# Patient Record
Sex: FEMALE | Race: OTHER | NOT HISPANIC OR LATINO | Employment: PART TIME | URBAN - METROPOLITAN AREA
[De-identification: names, ages, dates, MRNs, and addresses within clinical notes are randomized per-mention and may not be internally consistent; named-entity substitution may affect disease eponyms.]

---

## 2023-07-14 ENCOUNTER — OFFICE VISIT (OUTPATIENT)
Dept: OBSTETRICS AND GYNECOLOGY | Facility: CLINIC | Age: 28
End: 2023-07-14
Payer: MEDICAID

## 2023-07-14 VITALS
BODY MASS INDEX: 32.65 KG/M2 | SYSTOLIC BLOOD PRESSURE: 132 MMHG | WEIGHT: 184.31 LBS | DIASTOLIC BLOOD PRESSURE: 80 MMHG

## 2023-07-14 DIAGNOSIS — Z01.419 ENCOUNTER FOR GYNECOLOGICAL EXAMINATION (GENERAL) (ROUTINE) WITHOUT ABNORMAL FINDINGS: Primary | ICD-10-CM

## 2023-07-14 DIAGNOSIS — Z12.4 CERVICAL CANCER SCREENING: ICD-10-CM

## 2023-07-14 PROCEDURE — 3008F BODY MASS INDEX DOCD: CPT | Mod: CPTII,,, | Performed by: OBSTETRICS & GYNECOLOGY

## 2023-07-14 PROCEDURE — 1159F MED LIST DOCD IN RCRD: CPT | Mod: CPTII,,, | Performed by: OBSTETRICS & GYNECOLOGY

## 2023-07-14 PROCEDURE — 3008F PR BODY MASS INDEX (BMI) DOCUMENTED: ICD-10-PCS | Mod: CPTII,,, | Performed by: OBSTETRICS & GYNECOLOGY

## 2023-07-14 PROCEDURE — 99999 PR PBB SHADOW E&M-EST. PATIENT-LVL II: CPT | Mod: PBBFAC,,, | Performed by: OBSTETRICS & GYNECOLOGY

## 2023-07-14 PROCEDURE — 3075F SYST BP GE 130 - 139MM HG: CPT | Mod: CPTII,,, | Performed by: OBSTETRICS & GYNECOLOGY

## 2023-07-14 PROCEDURE — 1159F PR MEDICATION LIST DOCUMENTED IN MEDICAL RECORD: ICD-10-PCS | Mod: CPTII,,, | Performed by: OBSTETRICS & GYNECOLOGY

## 2023-07-14 PROCEDURE — 99385 PREV VISIT NEW AGE 18-39: CPT | Mod: S$PBB,,, | Performed by: OBSTETRICS & GYNECOLOGY

## 2023-07-14 PROCEDURE — 4010F ACE/ARB THERAPY RXD/TAKEN: CPT | Mod: CPTII,,, | Performed by: OBSTETRICS & GYNECOLOGY

## 2023-07-14 PROCEDURE — 4010F PR ACE/ARB THEARPY RXD/TAKEN: ICD-10-PCS | Mod: CPTII,,, | Performed by: OBSTETRICS & GYNECOLOGY

## 2023-07-14 PROCEDURE — 3075F PR MOST RECENT SYSTOLIC BLOOD PRESS GE 130-139MM HG: ICD-10-PCS | Mod: CPTII,,, | Performed by: OBSTETRICS & GYNECOLOGY

## 2023-07-14 PROCEDURE — 3079F DIAST BP 80-89 MM HG: CPT | Mod: CPTII,,, | Performed by: OBSTETRICS & GYNECOLOGY

## 2023-07-14 PROCEDURE — 88175 CYTOPATH C/V AUTO FLUID REDO: CPT | Performed by: OBSTETRICS & GYNECOLOGY

## 2023-07-14 PROCEDURE — 3079F PR MOST RECENT DIASTOLIC BLOOD PRESSURE 80-89 MM HG: ICD-10-PCS | Mod: CPTII,,, | Performed by: OBSTETRICS & GYNECOLOGY

## 2023-07-14 PROCEDURE — 99212 OFFICE O/P EST SF 10 MIN: CPT | Mod: PBBFAC,PN | Performed by: OBSTETRICS & GYNECOLOGY

## 2023-07-14 PROCEDURE — 99385 PR PREVENTIVE VISIT,NEW,18-39: ICD-10-PCS | Mod: S$PBB,,, | Performed by: OBSTETRICS & GYNECOLOGY

## 2023-07-14 PROCEDURE — 99999 PR PBB SHADOW E&M-EST. PATIENT-LVL II: ICD-10-PCS | Mod: PBBFAC,,, | Performed by: OBSTETRICS & GYNECOLOGY

## 2023-07-14 RX ORDER — FERROUS SULFATE 325(65) MG
TABLET ORAL
COMMUNITY

## 2023-07-14 RX ORDER — METRONIDAZOLE 10 MG/G
GEL TOPICAL
COMMUNITY
Start: 2023-06-20

## 2023-07-14 RX ORDER — LISINOPRIL 5 MG/1
TABLET ORAL
COMMUNITY

## 2023-07-14 RX ORDER — CLOBETASOL PROPIONATE 0.46 MG/ML
SOLUTION TOPICAL 2 TIMES DAILY
COMMUNITY
Start: 2023-06-21

## 2023-07-14 NOTE — PROGRESS NOTES
Chief Complaint   Patient presents with    [facility]    Well Woman     New Patient. Patient here for well woman exam, currently on cycle, day 5. Last cycle was 4-5 years ago       History and Physical:  Kiersten Sheehan is a 28 y.o. F who presents today for her routine annual GYN exam. The patient has no Gynecology complaints.    Annual:  Patient's last menstrual period was 2023 (exact date). 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 Didn't get HPV vaccination    Allergies: Review of patient's allergies indicates:  No Known Allergies  Past Medical History:   Diagnosis Date    Anemia      History reviewed. No pertinent surgical history.  MEDS:   Current Outpatient Medications on File Prior to Visit   Medication Sig Dispense Refill    clobetasoL (TEMOVATE) 0.05 % external solution Apply topically 2 (two) times daily.      dicyclomine (BENTYL) 20 mg tablet Take 1 tablet (20 mg total) by mouth 2 (two) times daily. 20 tablet 0    famotidine (PEPCID) 20 MG tablet Take 1 tablet (20 mg total) by mouth 2 (two) times daily. (Patient not taking: Reported on 2023) 20 tablet 0    ferrous sulfate (FEOSOL) 325 mg (65 mg iron) Tab tablet ferrous sulfate 325 mg (65 mg iron) tablet   TAKE 1 TABLET(S) EVERY DAY BY ORAL ROUTE FOR 90 DAYS.      lisinopriL (PRINIVIL,ZESTRIL) 5 MG tablet TAKE 1 TABLET BY MOUTH EVERY DAY for 30      metronidazole 1% (METROGEL) 1 % Gel SMARTSIG:sparingly Topical Daily       No current facility-administered medications on file prior to visit.     OB History          3    Para   3    Term   3            AB        Living   3         SAB        IAB        Ectopic        Multiple        Live Births                   Social History     Socioeconomic History     Marital status:    Tobacco Use    Smoking status: Never    Smokeless tobacco: Never   Substance and Sexual Activity    Alcohol use: Not Currently    Drug use: Never    Sexual activity: Yes     Family History   Problem Relation Age of Onset    Breast cancer Other        Past medical and surgical history reviewed.   I have reviewed the patient's medical history in detail and updated the computerized patient record.    Review of System:   General: no chills, fever, night sweats, weight gain or weight loss  Breasts: no new or changing breast lumps, nipple discharge or masses.  Gastrointestinal: no abdominal pain, change in bowel habits, or black or bloody stools  Genito-Urinary: no incontinence, urinary frequency/urgency or vulvar/vaginal symptoms, pelvic pain or abnormal vaginal bleeding.    Physical Exam:   /80   Wt 83.6 kg (184 lb 4.9 oz)   LMP 07/11/2023 (Exact Date)   BMI 32.65 kg/m²   Body mass index is 32.65 kg/m².  Constitutional: She appears alert and responsive. She appears well-developed, well-groomed, and well-nourished. No distress.  Breasts: are symmetrical.  Right breast exhibits no inverted nipple, no mass, no nipple discharge, no skin change and no tenderness.  Left breast exhibits no inverted nipple, no mass, no nipple discharge, no skin change and no tenderness.  Abdominal: Soft. She exhibits no distension, hernias or masses. There is no tenderness. No enlargement of liver edge or spleen.  There is no rebound and no guarding.   Genitourinary:    External rectal exam shows no thrombosed external hemorrhoids, no lesions.     Pelvic exam was performed with patient supine.   No labial fusion, and symmetrical.    There is no rash, lesion or injury on the right labia.   There is no rash, lesion or injury on the left labia.   No bleeding and no signs of injury around the vaginal introitus, urethral meatus is normal size and without prolapse or lesions, urethra well supported. The cervix is  visualized with no discharge, lesions or friability.   No vaginal discharge found.    No significant Cystocele, Enterocele or rectocele, and cervix and uterus well supported.   Bimanual exam:   The urethra is normal to palpation and there are no palpable vaginal wall masses.   Uterus is not deviated, not enlarged, not fixed, normal shape and not tender.   Cervix exhibits no motion tenderness.    Right adnexum displays no mass or nodularity and no tenderness.   Left adnexum displays no mass or nodularity and no tenderness.    Assessment/Plan:   Encounter for gynecological examination (general) (routine) without abnormal findings    Cervical cancer screening    Follow up in 1 year.

## 2023-07-21 LAB
FINAL PATHOLOGIC DIAGNOSIS: NORMAL
Lab: NORMAL

## 2024-09-17 ENCOUNTER — LAB VISIT (OUTPATIENT)
Dept: LAB | Facility: HOSPITAL | Age: 29
End: 2024-09-17
Payer: MEDICAID

## 2024-09-17 ENCOUNTER — OFFICE VISIT (OUTPATIENT)
Dept: OBSTETRICS AND GYNECOLOGY | Facility: CLINIC | Age: 29
End: 2024-09-17
Payer: MEDICAID

## 2024-09-17 VITALS
WEIGHT: 189.81 LBS | HEIGHT: 63 IN | BODY MASS INDEX: 33.63 KG/M2 | DIASTOLIC BLOOD PRESSURE: 90 MMHG | SYSTOLIC BLOOD PRESSURE: 150 MMHG

## 2024-09-17 DIAGNOSIS — N92.6 MISSED MENSES: Primary | ICD-10-CM

## 2024-09-17 DIAGNOSIS — N92.6 MISSED MENSES: ICD-10-CM

## 2024-09-17 LAB
B-HCG UR QL: NEGATIVE
CTP QC/QA: YES
DHEA-S SERPL-MCNC: 149.8 UG/DL (ref 95.8–511.7)
ESTIMATED AVG GLUCOSE: 97 MG/DL (ref 68–131)
ESTRADIOL SERPL-MCNC: 129 PG/ML
FSH SERPL-ACNC: 5.61 MIU/ML
HBA1C MFR BLD: 5 % (ref 4–5.6)
HCG INTACT+B SERPL-ACNC: <2.4 MIU/ML
LH SERPL-ACNC: 59.3 MIU/ML
PROLACTIN SERPL IA-MCNC: 9.4 NG/ML (ref 5.2–26.5)
TSH SERPL DL<=0.005 MIU/L-ACNC: 1.63 UIU/ML (ref 0.4–4)

## 2024-09-17 PROCEDURE — 36415 COLL VENOUS BLD VENIPUNCTURE: CPT | Mod: PN

## 2024-09-17 PROCEDURE — 99999PBSHW POCT URINE PREGNANCY: Mod: PBBFAC,,,

## 2024-09-17 PROCEDURE — 99213 OFFICE O/P EST LOW 20 MIN: CPT | Mod: S$PBB,,,

## 2024-09-17 PROCEDURE — 84402 ASSAY OF FREE TESTOSTERONE: CPT

## 2024-09-17 PROCEDURE — 84146 ASSAY OF PROLACTIN: CPT

## 2024-09-17 PROCEDURE — 82627 DEHYDROEPIANDROSTERONE: CPT

## 2024-09-17 PROCEDURE — 99213 OFFICE O/P EST LOW 20 MIN: CPT | Mod: PBBFAC,PN

## 2024-09-17 PROCEDURE — 3080F DIAST BP >= 90 MM HG: CPT | Mod: CPTII,,,

## 2024-09-17 PROCEDURE — 84443 ASSAY THYROID STIM HORMONE: CPT

## 2024-09-17 PROCEDURE — 83498 ASY HYDROXYPROGESTERONE 17-D: CPT

## 2024-09-17 PROCEDURE — 1159F MED LIST DOCD IN RCRD: CPT | Mod: CPTII,,,

## 2024-09-17 PROCEDURE — 3008F BODY MASS INDEX DOCD: CPT | Mod: CPTII,,,

## 2024-09-17 PROCEDURE — 81025 URINE PREGNANCY TEST: CPT | Mod: PBBFAC,PN

## 2024-09-17 PROCEDURE — 82670 ASSAY OF TOTAL ESTRADIOL: CPT

## 2024-09-17 PROCEDURE — 99999 PR PBB SHADOW E&M-EST. PATIENT-LVL III: CPT | Mod: PBBFAC,,,

## 2024-09-17 PROCEDURE — 84702 CHORIONIC GONADOTROPIN TEST: CPT

## 2024-09-17 PROCEDURE — 83002 ASSAY OF GONADOTROPIN (LH): CPT

## 2024-09-17 PROCEDURE — 1160F RVW MEDS BY RX/DR IN RCRD: CPT | Mod: CPTII,,,

## 2024-09-17 PROCEDURE — 83036 HEMOGLOBIN GLYCOSYLATED A1C: CPT

## 2024-09-17 PROCEDURE — 83001 ASSAY OF GONADOTROPIN (FSH): CPT

## 2024-09-17 PROCEDURE — 3077F SYST BP >= 140 MM HG: CPT | Mod: CPTII,,,

## 2024-09-17 RX ORDER — TIZANIDINE 4 MG/1
4 TABLET ORAL NIGHTLY PRN
COMMUNITY
Start: 2024-09-06

## 2024-09-17 RX ORDER — NAPROXEN 500 MG/1
500 TABLET ORAL EVERY 12 HOURS PRN
COMMUNITY
Start: 2024-09-06

## 2024-09-17 NOTE — PROGRESS NOTES
Subjective     Patient ID: Kiersten Sheehan is a 29 y.o. female.    Chief Complaint:  Amenorrhea      History of Present Illness  HPI  28 y/o WF  presents as a new patient to me for evaluation of missed menses. LMP 2024. Denies change in medication, diet, exercise, weight or significant stressors. Stopped breastfeeding about 18 months ago. Cycles have been monthly on average since that time. Negative UPT in office today     GYN & OB History  Patient's last menstrual period was 2024 (approximate).   Date of Last Pap: 2023    OB History    Para Term  AB Living   3 3 3     3   SAB IAB Ectopic Multiple Live Births                  # Outcome Date GA Lbr Jose/2nd Weight Sex Type Anes PTL Lv   3 Term            2 Term            1 Term              There is no problem list on file for this patient.    Past Medical History:   Diagnosis Date    Anemia      History reviewed. No pertinent surgical history.    Current Outpatient Medications on File Prior to Visit   Medication Sig Dispense Refill    ferrous sulfate (FEOSOL) 325 mg (65 mg iron) Tab tablet ferrous sulfate 325 mg (65 mg iron) tablet   TAKE 1 TABLET(S) EVERY DAY BY ORAL ROUTE FOR 90 DAYS.      naproxen (NAPROSYN) 500 MG tablet Take 500 mg by mouth every 12 (twelve) hours as needed.      tiZANidine (ZANAFLEX) 4 MG tablet Take 4 mg by mouth nightly as needed.      clobetasoL (TEMOVATE) 0.05 % external solution Apply topically 2 (two) times daily. (Patient not taking: Reported on 2024)      famotidine (PEPCID) 20 MG tablet Take 1 tablet (20 mg total) by mouth 2 (two) times daily. (Patient not taking: Reported on 2023) 20 tablet 0    lisinopriL (PRINIVIL,ZESTRIL) 5 MG tablet TAKE 1 TABLET BY MOUTH EVERY DAY for 30 (Patient not taking: Reported on 2024)      metronidazole 1% (METROGEL) 1 % Gel SMARTSIG:sparingly Topical Daily (Patient not taking: Reported on 2024)       No current facility-administered medications on  file prior to visit.       Review of Systems  Review of Systems   Constitutional:  Negative for chills and fever.   Eyes:  Negative for visual disturbance.   Respiratory:  Negative for shortness of breath.    Cardiovascular:  Negative for chest pain and palpitations.   Gastrointestinal:  Negative for abdominal pain, constipation, diarrhea, nausea and vomiting.   Genitourinary:  Positive for menstrual problem. Negative for pelvic pain, vaginal bleeding, vaginal discharge, vaginal pain, vaginal dryness and vaginal odor.   Musculoskeletal:  Negative for back pain.   Integumentary:  Negative for rash.   Neurological:  Negative for seizures, syncope and headaches.   Hematological:  Does not bruise/bleed easily.   Psychiatric/Behavioral:  Negative for depression. The patient is not nervous/anxious.           Objective   Physical Exam:   Constitutional: She is oriented to person, place, and time. She appears well-developed and well-nourished. No distress.    HENT:   Head: Normocephalic.   Nose: No epistaxis.    Eyes: Pupils are equal, round, and reactive to light.      Pulmonary/Chest: Effort normal. No respiratory distress.        Abdominal: Soft. She exhibits no distension. There is no abdominal tenderness.     Genitourinary:    Inguinal canal, vagina, uterus, right adnexa and left adnexa normal.      Pelvic exam was performed with patient supine.   The external female genitalia was normal.     Labial bartholins normal.There is no rash, tenderness or lesion on the right labia. There is no rash, tenderness or lesion on the left labia. Cervix is normal. Right adnexum displays no mass, no tenderness and no fullness. Left adnexum displays no mass, no tenderness and no fullness. Vagina exhibits no lesion. No erythema, vaginal discharge, tenderness or bleeding in the vagina.    No signs of injury in the vagina.   Uterus consistancy normal and Uerus contour normal            Musculoskeletal: Normal range of motion and moves all  extremeties.       Neurological: She is alert and oriented to person, place, and time.    Skin: Skin is warm and dry.    Psychiatric: She has a normal mood and affect. Judgment and thought content normal.            Assessment and Plan     1. Missed menses          Plan:  - PCOS labs and pelvic ultrasound ordered  - Pending lab work will send PCT  - Will contact with results and follow

## 2024-09-20 LAB
17OHP SERPL-MCNC: 117 NG/DL (ref 35–413)
TESTOST FREE SERPL-MCNC: 0.9 PG/ML

## 2024-09-24 ENCOUNTER — TELEPHONE (OUTPATIENT)
Dept: OBSTETRICS AND GYNECOLOGY | Facility: CLINIC | Age: 29
End: 2024-09-24
Payer: MEDICAID

## 2024-09-24 ENCOUNTER — HOSPITAL ENCOUNTER (OUTPATIENT)
Dept: RADIOLOGY | Facility: HOSPITAL | Age: 29
Discharge: HOME | End: 2024-09-24
Payer: MEDICAID

## 2024-09-24 DIAGNOSIS — N92.6 MISSED MENSES: ICD-10-CM

## 2024-09-24 DIAGNOSIS — N91.5 OLIGOMENORRHEA, UNSPECIFIED TYPE: Primary | ICD-10-CM

## 2024-09-24 PROCEDURE — 76856 US EXAM PELVIC COMPLETE: CPT | Mod: 26,,, | Performed by: RADIOLOGY

## 2024-09-24 PROCEDURE — 76856 US EXAM PELVIC COMPLETE: CPT | Mod: TC,PN

## 2024-09-24 PROCEDURE — 76830 TRANSVAGINAL US NON-OB: CPT | Mod: 26,,, | Performed by: RADIOLOGY

## 2024-09-24 PROCEDURE — 76830 TRANSVAGINAL US NON-OB: CPT | Mod: TC,PN

## 2024-09-24 RX ORDER — MEDROXYPROGESTERONE ACETATE 10 MG/1
10 TABLET ORAL DAILY
Qty: 10 TABLET | Refills: 0 | Status: SHIPPED | OUTPATIENT
Start: 2024-09-24 | End: 2024-10-04

## 2024-09-24 NOTE — TELEPHONE ENCOUNTER
Spoke with patient on the phone. Still has not started a cycle. LMP 6/26. PCT sent, instructed on use. Follow up as needed     ----- Message from Arpit Patricia sent at 9/24/2024  2:03 PM CDT -----  Contact: Self  Type:  Patient Returning Call    Who Called:  Patient  Who Left Message for Patient:  ?  Does the patient know what this is regarding?:  Yes  Best Call Back Number:  [phone]  Additional Information:  Patient returning missed call

## 2024-10-05 ENCOUNTER — PATIENT MESSAGE (OUTPATIENT)
Dept: OBSTETRICS AND GYNECOLOGY | Facility: CLINIC | Age: 29
End: 2024-10-05
Payer: MEDICAID

## 2024-10-16 ENCOUNTER — PATIENT MESSAGE (OUTPATIENT)
Dept: RESEARCH | Facility: HOSPITAL | Age: 29
End: 2024-10-16
Payer: MEDICAID